# Patient Record
Sex: FEMALE | Race: WHITE
[De-identification: names, ages, dates, MRNs, and addresses within clinical notes are randomized per-mention and may not be internally consistent; named-entity substitution may affect disease eponyms.]

---

## 2020-11-06 ENCOUNTER — HOSPITAL ENCOUNTER (EMERGENCY)
Dept: HOSPITAL 75 - ER | Age: 40
Discharge: HOME | End: 2020-11-06
Payer: COMMERCIAL

## 2020-11-06 VITALS — WEIGHT: 164.91 LBS | BODY MASS INDEX: 22.34 KG/M2 | HEIGHT: 71.97 IN

## 2020-11-06 VITALS — SYSTOLIC BLOOD PRESSURE: 112 MMHG | DIASTOLIC BLOOD PRESSURE: 78 MMHG

## 2020-11-06 DIAGNOSIS — F41.9: ICD-10-CM

## 2020-11-06 DIAGNOSIS — G47.00: Primary | ICD-10-CM

## 2020-11-06 LAB
ALBUMIN SERPL-MCNC: 4.2 GM/DL (ref 3.2–4.5)
ALP SERPL-CCNC: 54 U/L (ref 40–136)
ALT SERPL-CCNC: 16 U/L (ref 0–55)
APTT PPP: YELLOW S
BACTERIA #/AREA URNS HPF: NEGATIVE /HPF
BASOPHILS # BLD AUTO: 0 10^3/UL (ref 0–0.1)
BASOPHILS NFR BLD AUTO: 0 % (ref 0–10)
BILIRUB SERPL-MCNC: 1.7 MG/DL (ref 0.1–1)
BILIRUB UR QL STRIP: NEGATIVE
BUN/CREAT SERPL: 9
CALCIUM SERPL-MCNC: 9.1 MG/DL (ref 8.5–10.1)
CHLORIDE SERPL-SCNC: 104 MMOL/L (ref 98–107)
CK SERPL-CCNC: 30 U/L (ref 29–168)
CO2 SERPL-SCNC: 22 MMOL/L (ref 21–32)
CREAT SERPL-MCNC: 0.66 MG/DL (ref 0.6–1.3)
EOSINOPHIL # BLD AUTO: 0 10^3/UL (ref 0–0.3)
EOSINOPHIL NFR BLD AUTO: 0 % (ref 0–10)
FIBRINOGEN PPP-MCNC: CLEAR MG/DL
GFR SERPLBLD BASED ON 1.73 SQ M-ARVRAT: > 60 ML/MIN
GLUCOSE SERPL-MCNC: 101 MG/DL (ref 70–105)
GLUCOSE UR STRIP-MCNC: NEGATIVE MG/DL
HCT VFR BLD CALC: 40 % (ref 35–52)
HGB BLD-MCNC: 14.1 G/DL (ref 11.5–16)
KETONES UR QL STRIP: (no result)
LEUKOCYTE ESTERASE UR QL STRIP: (no result)
LYMPHOCYTES # BLD AUTO: 1 10^3/UL (ref 1–4)
LYMPHOCYTES NFR BLD AUTO: 25 % (ref 12–44)
MAGNESIUM SERPL-MCNC: 1.9 MG/DL (ref 1.6–2.4)
MANUAL DIFFERENTIAL PERFORMED BLD QL: NO
MCH RBC QN AUTO: 31 PG (ref 25–34)
MCHC RBC AUTO-ENTMCNC: 35 G/DL (ref 32–36)
MCV RBC AUTO: 89 FL (ref 80–99)
MONOCYTES # BLD AUTO: 0.3 10^3/UL (ref 0–1)
MONOCYTES NFR BLD AUTO: 8 % (ref 0–12)
NEUTROPHILS # BLD AUTO: 2.7 10^3/UL (ref 1.8–7.8)
NEUTROPHILS NFR BLD AUTO: 67 % (ref 42–75)
NITRITE UR QL STRIP: NEGATIVE
PH UR STRIP: 7 [PH] (ref 5–9)
PLATELET # BLD: 162 10^3/UL (ref 130–400)
PMV BLD AUTO: 10.1 FL (ref 9–12.2)
POTASSIUM SERPL-SCNC: 3.8 MMOL/L (ref 3.6–5)
PROT SERPL-MCNC: 7.1 GM/DL (ref 6.4–8.2)
PROT UR QL STRIP: NEGATIVE
RBC #/AREA URNS HPF: (no result) /HPF
SODIUM SERPL-SCNC: 137 MMOL/L (ref 135–145)
SP GR UR STRIP: <=1.005 (ref 1.02–1.02)
SQUAMOUS #/AREA URNS HPF: (no result) /HPF
TSH SERPL DL<=0.05 MIU/L-ACNC: 0.93 UIU/ML (ref 0.35–4.94)
WBC # BLD AUTO: 4.1 10^3/UL (ref 4.3–11)
WBC #/AREA URNS HPF: (no result) /HPF

## 2020-11-06 PROCEDURE — 84703 CHORIONIC GONADOTROPIN ASSAY: CPT

## 2020-11-06 PROCEDURE — 84145 PROCALCITONIN (PCT): CPT

## 2020-11-06 PROCEDURE — 82550 ASSAY OF CK (CPK): CPT

## 2020-11-06 PROCEDURE — 87804 INFLUENZA ASSAY W/OPTIC: CPT

## 2020-11-06 PROCEDURE — 83615 LACTATE (LD) (LDH) ENZYME: CPT

## 2020-11-06 PROCEDURE — 84443 ASSAY THYROID STIM HORMONE: CPT

## 2020-11-06 PROCEDURE — 85025 COMPLETE CBC W/AUTO DIFF WBC: CPT

## 2020-11-06 PROCEDURE — 81000 URINALYSIS NONAUTO W/SCOPE: CPT

## 2020-11-06 PROCEDURE — 86141 C-REACTIVE PROTEIN HS: CPT

## 2020-11-06 PROCEDURE — 83735 ASSAY OF MAGNESIUM: CPT

## 2020-11-06 PROCEDURE — 80053 COMPREHEN METABOLIC PANEL: CPT

## 2020-11-06 PROCEDURE — 36415 COLL VENOUS BLD VENIPUNCTURE: CPT

## 2020-11-06 NOTE — ED GENERAL
General


Chief Complaint:  General Problems/Pain


Stated Complaint:  ANXIETY


Nursing Triage Note:  


Pt reports COVID symptoms about 14 days ago.  Pt feels those symptoms have 


resolved, but pt c/o severe anxiety and reports insomnia and anxiety.  Pt 


reports racing heart and nausea.  Pt reports losing taste and smell three days 


ago.  Pt reports seeing  in [place] yesterday and was prescribed hydroxyzine 


and clorazapate that are not providing relief.


Nursing Sepsis Screen:  No Definite Risk


Source of Information:  Patient


Exam Limitations:  No Limitations





History of Present Illness


Date Seen by Provider:  Nov 6, 2020


Time Seen by Provider:  09:25


Initial Comments


Catrina Carroll is a 39-year-old woman who presents to the emergency room with a 

primary complaints of insomnia, anxiety, nausea, and vomiting.  Symptoms started

about 2 weeks ago with myalgia, fever, and congestion.  After about a week those

symptoms subsided, but the other symptoms remain.  She has had some extreme 

anxiety although she does not appear anxious at this moment.  She was seen at an

urgent care facility in [place] yesterday and prescribed Lexapro, clorazepate, 

and hydroxyzine.  She has taken all of these medications and still had 

significant difficulty with anxiety and insomnia.  She has been taking Zofran 

for the nausea and vomiting.  Loss of taste and smell were a later onset of 

symptoms and still persist to a lesser degree.  She denies any chest pain, short

ness of breath, or cough.  She has not had fever for several days.  She elects 

to not be tested for Covid as most of the Covid symptoms have resolved and she 

is on day 14 of symptoms.  She notes having had minor symptoms of anxiety and 

depression previously during the postpartum period.  She previously took Lexapro

for postpartum depression with good results.  She has had a few psychosocial 

stressors with Tenriism but otherwise has a stable home life without any new 

significant stressors.





Allergies and Home Medications


Allergies


Coded Allergies:  


     No Known Drug Allergies (Unverified , 11/6/20)





Patient Home Medication List


Home Medication List Reviewed:  Yes





Review of Systems


Review of Systems


Constitutional:  see HPI


EENTM:  nose congestion (Resolved)


Respiratory:  no symptoms reported


Cardiovascular:  palpitations


Gastrointestinal:  see HPI


Genitourinary:  no symptoms reported


Pregnant:  No


Musculoskeletal:  see HPI


Skin:  no symptoms reported


Psychiatric/Neurological:  See HPI


Hematologic/Lymphatic:  No Symptoms Reported


Immunological/Allergic:  no symptoms reported





Past Medical-Social-Family Hx


Past Med/Social Hx:  Reviewed Nursing Past Med/Soc Hx


Patient Social History


Alcohol Use:  Denies Use


Recreational Drug Use:  No


2nd Hand Smoke Exposure:  No


Recent Foreign Travel:  No


Contact w/Someone Who Travel:  No


Recent Infectious Disease Expo:  No


Recent Hopitalizations:  No


Physical Abuse:  No


Sexual Abuse:  No


Mistreated:  No


Fear:  No





Seasonal Allergies


Seasonal Allergies:  No





Past Medical History


Surgeries:  Yes (R knee)


Orthopedic, Tonsillectomy


Respiratory:  No


Cardiac:  No


Neurological:  No


Genitourinary:  No


Gastrointestinal:  No


Musculoskeletal:  No


Endocrine:  No


HEENT:  No


Cancer:  No


Psychosocial:  Yes (Postpartum depression)


Integumentary:  No


Blood Disorders:  No





Physical Exam


Vital Signs





Vital Signs - First Documented








 11/6/20





 09:44


 


Temp 36.7


 


Pulse 86


 


Resp 20


 


B/P (MAP) 112/78 (89)


 


Pulse Ox 95


 


O2 Delivery Room Air





Capillary Refill : Less Than 3 Seconds


Height, Weight, BMI


Height: '"


Weight: lbs. oz. kg; 22.00 BMI


Method:


General Appearance:  No Apparent Distress, WD/WN


HEENT:  PERRL/EOMI, Normal ENT Inspection, Other (Oropharynx dry)


Neck:  Normal Inspection


Respiratory:  Lungs Clear, Normal Breath Sounds, No Accessory Muscle Use, No 

Respiratory Distress


Cardiovascular:  Regular Rate, Rhythm, No Edema, No Murmur


Gastrointestinal:  Normal Bowel Sounds, Non Tender, Soft


Extremity:  Normal Inspection, Non Tender, No Pedal Edema


Neurologic/Psychiatric:  Alert, Oriented x3, No Motor/Sensory Deficits, Normal 

Mood/Affect, CNs II-XII Norm as Tested


Skin:  Normal Color, Warm/Dry





Progress/Results/Core Measures


Suspected Sepsis


Recent Fever Within 48 Hours:  Yes


Infection Criteria Present:  None


New/Unexplained  Altered Menta:  No


Sepsis Screen:  No Definite Risk


SIRS


Temperature: 


Pulse: 86 


Respiratory Rate: 20


 


Blood Pressure 112 /78 


Mean: 89


 








Results/Orders


Lab Results





Micro Results





My Orders





Medications Given in ED





Vital Signs/I&O


Capillary Refill : Less Than 3 Seconds








Blood Pressure Mean:                    89








Progress Note :  


Progress Note


Patient received a liter of IV fluid.  Work-up was unremarkable.  Patient seems 

to be most anxious about having insomnia and fear that her anxiety will not 

improve with medications.  She seems to be stuck in a cycle of anxiety about 

having anxiety.  I have encouraged her to follow the recommendations and the 

discharge instructions.





Departure


Impression





   Primary Impression:  


   Insomnia


   Qualified Codes:  G47.00 - Insomnia, unspecified


   Additional Impression:  


   Anxiety


Disposition:  01 HOME, SELF-CARE


Condition:  Stable





Departure-Patient Inst.


Decision time for Depature:  12:02


Referrals:  


NO,LOCAL PHYSICIAN (PCP/Family)


Primary Care Physician


Patient Instructions:  Anxiety, Adult (DC), Insomnia (DC)





Add. Discharge Instructions:  


Since you had symptoms suspicious for COVID, remain in quarantine through the 

end of today which would be your 14th day.


Be active today, especially outside in the sunlight if possible.  If you are 

feeling anxious in your bedtime, take clorazepate about 30 minutes before bed.  

Take another one if you are unable to go to sleep within 30 to 60 minutes or if 

you wake up in the night.  Continue taking Lexapro as prescribed.


Follow-up with your primary care provider next week.  Consider establishing with

a counselor to learn how to stop the cycle of having anxiety about anxiety.


Call Dr. Morrison or return to care if you have worsening symptoms that are 

not treatable with the tools you have available.





All discharge instructions reviewed with patient and/or family. Voiced 

understanding.











HERNAN MORRISON MD         Nov 6, 2020 11:24